# Patient Record
Sex: MALE | Race: WHITE | NOT HISPANIC OR LATINO | URBAN - METROPOLITAN AREA
[De-identification: names, ages, dates, MRNs, and addresses within clinical notes are randomized per-mention and may not be internally consistent; named-entity substitution may affect disease eponyms.]

---

## 2020-01-20 ENCOUNTER — APPOINTMENT (EMERGENCY)
Dept: RADIOLOGY | Facility: HOSPITAL | Age: 20
End: 2020-01-20
Payer: COMMERCIAL

## 2020-01-20 ENCOUNTER — HOSPITAL ENCOUNTER (EMERGENCY)
Facility: HOSPITAL | Age: 20
Discharge: HOME | End: 2020-01-20
Attending: EMERGENCY MEDICINE
Payer: COMMERCIAL

## 2020-01-20 VITALS
RESPIRATION RATE: 19 BRPM | TEMPERATURE: 98 F | HEART RATE: 77 BPM | DIASTOLIC BLOOD PRESSURE: 70 MMHG | WEIGHT: 143 LBS | BODY MASS INDEX: 21.67 KG/M2 | OXYGEN SATURATION: 100 % | SYSTOLIC BLOOD PRESSURE: 133 MMHG | HEIGHT: 68 IN

## 2020-01-20 DIAGNOSIS — N45.3 ORCHITIS AND EPIDIDYMITIS: Primary | ICD-10-CM

## 2020-01-20 LAB
BACTERIA URNS QL MICRO: NORMAL /HPF
BILIRUB UR QL STRIP.AUTO: NEGATIVE MG/DL
CLARITY UR REFRACT.AUTO: ABNORMAL
COLOR UR AUTO: YELLOW
GLUCOSE UR STRIP.AUTO-MCNC: NEGATIVE MG/DL
HGB UR QL STRIP.AUTO: NEGATIVE
HYALINE CASTS #/AREA URNS LPF: NORMAL /LPF
KETONES UR STRIP.AUTO-MCNC: NEGATIVE MG/DL
LEUKOCYTE ESTERASE UR QL STRIP.AUTO: NEGATIVE
NITRITE UR QL STRIP.AUTO: NEGATIVE
PH UR STRIP.AUTO: 7.5 [PH]
PROT UR QL STRIP.AUTO: 1
RBC #/AREA URNS HPF: NORMAL /HPF
SP GR UR REFRACT.AUTO: 1.01
SQUAMOUS URNS QL MICRO: NORMAL /HPF
UROBILINOGEN UR STRIP-ACNC: 0.2 EU/DL
WBC #/AREA URNS HPF: NORMAL /HPF

## 2020-01-20 PROCEDURE — 93975 VASCULAR STUDY: CPT

## 2020-01-20 PROCEDURE — 99285 EMERGENCY DEPT VISIT HI MDM: CPT | Mod: 25

## 2020-01-20 PROCEDURE — 36415 COLL VENOUS BLD VENIPUNCTURE: CPT | Performed by: EMERGENCY MEDICINE

## 2020-01-20 PROCEDURE — 87591 N.GONORRHOEAE DNA AMP PROB: CPT | Performed by: EMERGENCY MEDICINE

## 2020-01-20 PROCEDURE — 81001 URINALYSIS AUTO W/SCOPE: CPT | Performed by: EMERGENCY MEDICINE

## 2020-01-20 PROCEDURE — 76870 US EXAM SCROTUM: CPT

## 2020-01-20 PROCEDURE — 63700000 HC SELF-ADMINISTRABLE DRUG: Performed by: EMERGENCY MEDICINE

## 2020-01-20 PROCEDURE — 86735 MUMPS ANTIBODY: CPT | Performed by: EMERGENCY MEDICINE

## 2020-01-20 RX ORDER — ACETAMINOPHEN 325 MG/1
650 TABLET ORAL ONCE
Status: COMPLETED | OUTPATIENT
Start: 2020-01-20 | End: 2020-01-20

## 2020-01-20 RX ORDER — LEVOFLOXACIN 500 MG/1
500 TABLET, FILM COATED ORAL ONCE
Status: COMPLETED | OUTPATIENT
Start: 2020-01-20 | End: 2020-01-20

## 2020-01-20 RX ORDER — LEVOFLOXACIN 500 MG/1
500 TABLET, FILM COATED ORAL DAILY
Qty: 9 TABLET | Refills: 0 | Status: SHIPPED | OUTPATIENT
Start: 2020-01-20 | End: 2020-01-29

## 2020-01-20 RX ADMIN — LEVOFLOXACIN 500 MG: 500 TABLET, FILM COATED ORAL at 09:09

## 2020-01-20 RX ADMIN — ACETAMINOPHEN 650 MG: 325 TABLET, FILM COATED ORAL at 07:09

## 2020-01-20 SDOH — HEALTH STABILITY: MENTAL HEALTH: HOW OFTEN DO YOU HAVE A DRINK CONTAINING ALCOHOL?: 2-4 TIMES A MONTH

## 2020-01-20 ASSESSMENT — ENCOUNTER SYMPTOMS
EYE REDNESS: 0
SHORTNESS OF BREATH: 0
HEADACHES: 0
ABDOMINAL PAIN: 1
JOINT SWELLING: 0
FEVER: 0
VOMITING: 0
HEMATURIA: 0
BLOOD IN STOOL: 0

## 2020-01-20 NOTE — DISCHARGE INSTRUCTIONS
RETURN IMMEDIATELY FOR ANY NEW OR WORSE SYMPTOMS;        REVIEW ANY LABS AND FINAL IMAGING RESULTS (SOMETIMES THESE MAY NOT BE BACK UNTIL TOMORROW)  WITH YOUR DOCTORS AT YOUR   NEXT APPOINTMENT.    As discussed, it is unlikely that your symptoms are due to mumps, however after discussion with the infection specialist we decided to send blood test to screen for mumps.    Review these with the health service office at your [facility] within the next couple of days.

## 2020-01-20 NOTE — ED PROVIDER NOTES
HPI     Chief Complaint   Patient presents with   • Abdominal Pain   • Groin Swelling       Eval in 06  RN hx/EHR reviewed  Hx per pt    Here for lower abdominal pain and testicle pain . Pt presents to ED w/ lower abdominal pain and testicular pain that began 3 days ago. This has been intermittent and gradually worsening w/ no alleviating factors. Pt states that painstarts usually at night and persists throughout night. Pain minimal during the day; pt is ont sexually active; Standing and walking are uncomfortable but pain usually improves. He denies any swelling,painful urintaion, trauma ,fever.    No fever/body aches/runny nose/nasa congestion;  Pt is fully immunized;  No sick contacts;  He's a local college student      History provided by:  Patient  Abdominal Pain   Pain location: R testicle.  Pain quality: dull    Pain radiates to:  Does not radiate  Pain severity:  Moderate  Onset quality:  Gradual  Duration:  3 days  Timing:  Intermittent  Progression:  Worsening  Chronicity:  New  Relieved by:  Nothing  Worsened by:  Nothing  Ineffective treatments:  None tried  Associated symptoms: no chest pain, no fever, no hematuria, no shortness of breath and no vomiting         Patient History     History reviewed. No pertinent past medical history.    History reviewed. No pertinent surgical history.    History reviewed. No pertinent family history.    Social History     Tobacco Use   • Smoking status: Never Smoker   • Smokeless tobacco: Never Used   Substance Use Topics   • Alcohol use: Yes     Frequency: 2-4 times a month   • Drug use: Not Currently       Systems Reviewed from Nursing Triage:          Review of Systems     Review of Systems   Constitutional: Negative for fever.   HENT: Negative for nosebleeds.    Eyes: Negative for redness.   Respiratory: Negative for shortness of breath.    Cardiovascular: Negative for chest pain.   Gastrointestinal: Positive for abdominal pain. Negative for blood in stool and  "vomiting.   Genitourinary: Positive for testicular pain. Negative for hematuria.   Musculoskeletal: Negative for joint swelling.   Skin: Negative for rash.   Neurological: Negative for headaches.   All other systems reviewed and are negative.       Physical Exam     ED Triage Vitals   Temp Heart Rate Resp BP SpO2   01/20/20 0538 01/20/20 0708 01/20/20 0538 01/20/20 0538 01/20/20 0538   37.5 °C (99.5 °F) 77 18 (!) 150/86 100 %      Temp Source Heart Rate Source Patient Position BP Location FiO2 (%) (Set)   01/20/20 0538 01/20/20 0538 01/20/20 0538 01/20/20 0538 --   Oral Monitor Sitting Right upper arm                      Patient Vitals for the past 24 hrs:   BP Temp Temp src Pulse Resp SpO2 Height Weight   01/20/20 0841 133/70 -- -- -- 19 100 % -- --   01/20/20 0708 125/71 36.7 °C (98 °F) Oral 77 18 99 % -- --   01/20/20 0538 (!) 150/86 37.5 °C (99.5 °F) Oral -- 18 100 % 1.727 m (5' 8\") 64.9 kg (143 lb)                                       Physical Exam   Constitutional: He appears well-developed. No distress.   HENT:   Head: Normocephalic and atraumatic.   No parotid/facial swelling/tenderness   Eyes: Conjunctivae are normal. No scleral icterus.   Neck: Neck supple. No tracheal deviation present.   Cardiovascular: Normal rate, regular rhythm and normal heart sounds.   No murmur heard.  2+ radial and dp b/l   Pulmonary/Chest: Effort normal and breath sounds normal. No respiratory distress.   Abdominal: Soft. He exhibits no distension and no mass. There is tenderness (mild trace lower abdominal tenderness). There is no rebound and no guarding.   Genitourinary:   Genitourinary Comments: Mild Right inguinal and testicular tenderness, otherwise no testicular/inguinal tenderness;    No skin changes     Musculoskeletal: Normal range of motion. He exhibits no tenderness.   No calf assymetry;  No LE cords/tenderness   Neurological: He is alert.   Communicates clearly;  Moves all extremities   Skin: Skin is warm and dry. "   Psychiatric: He has a normal mood and affect.   Nursing note and vitals reviewed.           Procedures    ED Course & MDM     Labs Reviewed   UA REFLEX CULTURE (MACROSCOPIC) - Abnormal       Result Value    Color, Urine Yellow      Clarity, Urine Cloudy (*)     Specific Gravity, Urine 1.012      pH, Urine 7.5      Leukocyte Esterase Negative      Nitrite, Urine Negative      Protein, Urine +1 (*)     Glucose, Urine Negative      Ketones, Urine Negative      Urobilinogen, Urine 0.2      Bilirubin, Urine Negative      Blood, Urine Negative     UA MICROSCOPIC - Normal    RBC, Urine 0 TO 4      WBC, Urine 0 TO 3      Squamous Epithelial None Seen      Hyaline Cast None Seen      Bacteria, Urine None Seen     CHLAMYDIA/GC DNA,PCR:URINE,SWAB   URINALYSIS REFLEX CULTURE (ED AND OUTPATIENT ONLY)    Narrative:     The following orders were created for panel order Urinalysis w/ reflex culture.  Procedure                               Abnormality         Status                     ---------                               -----------         ------                     UA Reflex to Culture (Ma...[[id number]]  Abnormal            Final result               UA Microscopic[[id number]]               Normal              Final result                 Please view results for these tests on the individual orders.   MUMPS ANTIBODY, IGM   MUMPS ANTIBODY, IGG       ULTRASOUND SCROTUM   Final Result   IMPRESSION:   No testicular torsion.   Minimal increased right testicular vascularity in the region of the rete testis,   of indeterminate clinical significance.      COMMENT:      Comparison: None similar available for review      TECHNIQUE: Ultrasound of the scrotum was performed using grayscale, color   Doppler and spectral Doppler techniques.      RIGHT:      Right testis: Testicle is normal in size, echotexture and echogenicity. Normal   low resistance arterial waveforms, as well as venous waveforms.  Minimal   increased vascularity in  region of the rete testes, of indeterminate clinical   significance. Testicle measures 1.8 x 4.6 x 2.9 cm, for an estimated volume of   13 mL.   Right epididymal head: Normal.      Hydrocele: None significant.   Varicocele: None.      LEFT:      Left testis: Testicle is normal in size, echotexture and echogenicity. Normal   low resistance arterial waveforms, as well as venous waveforms.  Testicle   measures 2.0 x 4.3 x 3.1 cm, for an estimated volume of 14.0 mL.   Left epididymal head: Normal.      Hydrocele: None significant.   Varicocele: None.      I certify that I have personally reviewed this examination and agree with this   report.   Andres Hammond MD      ULTRASOUND DOPPLER   Final Result   IMPRESSION:   No testicular torsion.   Minimal increased right testicular vascularity in the region of the rete testis,   of indeterminate clinical significance.      COMMENT:      Comparison: None similar available for review      TECHNIQUE: Ultrasound of the scrotum was performed using grayscale, color   Doppler and spectral Doppler techniques.      RIGHT:      Right testis: Testicle is normal in size, echotexture and echogenicity. Normal   low resistance arterial waveforms, as well as venous waveforms.  Minimal   increased vascularity in region of the rete testes, of indeterminate clinical   significance. Testicle measures 1.8 x 4.6 x 2.9 cm, for an estimated volume of   13 mL.   Right epididymal head: Normal.      Hydrocele: None significant.   Varicocele: None.      LEFT:      Left testis: Testicle is normal in size, echotexture and echogenicity. Normal   low resistance arterial waveforms, as well as venous waveforms.  Testicle   measures 2.0 x 4.3 x 3.1 cm, for an estimated volume of 14.0 mL.   Left epididymal head: Normal.      Hydrocele: None significant.   Varicocele: None.      I certify that I have personally reviewed this examination and agree with this   report.   Andres Hammond MD                   [facility]      Scribe Attestation  By signing my name below, I, David East, attest that this documentation has been prepared under the direction and in the presence of Dr. Lopez.    1/20/2020 7:03 AM    I, Donato Lopez, personally performed the services described in this documentation, as documented by the scribe in my presence, and it is both accurate and complete.  1/20/2020 4:55 PM      ED Course as of Jan 20 1657   Mon Jan 20, 2020   0657 Primary concern for right epididymitis/orchitis.  Less likely testicular torsion.  Lower suspicion of appendicitis or intra-abdominal emergency.    [JF]   0811 Testicular ultrasound consistent with right epididymitis orchitis.    [JF]   0848 On reevaluation patient feels and looks well.  His abdomen is completely nontender now.    [JF]   0853 Patient has a cephalosporin allergy.  He denies being sexually active.  Will discharge home with levofloxacin.    [JF]   1655 Prior to dc, discussed w/ ID whether mumps a consideration (though would be atypical given lack of other sx)---he reocmmends bob igg/igm; no need for pt isolation; Dr :Savannah agrees risk is low, however given college student prudent to send serologies; input appreciated; I agree w/ input;    [JF]      ED Course User Index  [JF] Donato Lopez MD         Clinical Impressions as of Jan 20 1657   Orchitis and epididymitis        David East  01/20/20 0704       Donato Lopez MD  01/20/20 1657

## 2020-01-21 LAB
C TRACH DNA SPEC QL NAA+PROBE: NOT DETECTED
MUV AB SER-ACNC: 186 AU/ML
N GONORRHOEA DNA SPEC QL NAA+PROBE: NOT DETECTED

## 2020-01-22 LAB — MUV IGM TITR SER IF: NORMAL TITER

## 2020-08-18 ENCOUNTER — APPOINTMENT (OUTPATIENT)
Age: 20
Setting detail: DERMATOLOGY
End: 2020-08-19

## 2020-08-18 DIAGNOSIS — L21.8 OTHER SEBORRHEIC DERMATITIS: ICD-10-CM

## 2020-08-18 DIAGNOSIS — Z71.89 OTHER SPECIFIED COUNSELING: ICD-10-CM

## 2020-08-18 PROCEDURE — OTHER SUNSCREEN RECOMMENDATIONS: OTHER

## 2020-08-18 PROCEDURE — OTHER PRESCRIPTION: OTHER

## 2020-08-18 PROCEDURE — OTHER COUNSELING: OTHER

## 2020-08-18 PROCEDURE — 99203 OFFICE O/P NEW LOW 30 MIN: CPT

## 2020-08-18 PROCEDURE — OTHER MIPS QUALITY: OTHER

## 2020-08-18 RX ORDER — CLOBETASOL PROPIONATE 0.5 MG/G
AEROSOL, FOAM TOPICAL
Qty: 1 | Refills: 6 | Status: ERX | COMMUNITY
Start: 2020-08-18

## 2020-08-18 RX ORDER — TRIAMCINOLONE ACETONIDE 0.25 MG/G
CREAM TOPICAL
Qty: 1 | Refills: 1 | Status: ERX | COMMUNITY
Start: 2020-08-18

## 2020-08-18 ASSESSMENT — LOCATION ZONE DERM
LOCATION ZONE: FACE
LOCATION ZONE: SCALP
LOCATION ZONE: EAR

## 2020-08-18 ASSESSMENT — LOCATION SIMPLE DESCRIPTION DERM
LOCATION SIMPLE: LEFT CHEEK
LOCATION SIMPLE: SCALP
LOCATION SIMPLE: LEFT FOREHEAD
LOCATION SIMPLE: RIGHT CHEEK
LOCATION SIMPLE: RIGHT EAR

## 2020-08-18 ASSESSMENT — SEVERITY ASSESSMENT: HOW SEVERE IS THIS PATIENT'S CONDITION?: MODERATE

## 2020-08-18 ASSESSMENT — LOCATION DETAILED DESCRIPTION DERM
LOCATION DETAILED: RIGHT TRAGUS
LOCATION DETAILED: RIGHT SUPERIOR MEDIAL BUCCAL CHEEK
LOCATION DETAILED: LEFT SUPERIOR MEDIAL BUCCAL CHEEK
LOCATION DETAILED: LEFT SUPERIOR PREAURICULAR CHEEK
LOCATION DETAILED: RIGHT CENTRAL FRONTAL SCALP
LOCATION DETAILED: LEFT INFERIOR LATERAL FOREHEAD

## 2020-08-18 NOTE — PROCEDURE: MIPS QUALITY
Name And Contact Information For Health Care Proxy: Carmen Carrero\\nMother\\[phone] Name And Contact Information For Health Care Proxy: Carmen Carrero\\nMother\\[id number]